# Patient Record
Sex: FEMALE | Race: WHITE | URBAN - METROPOLITAN AREA
[De-identification: names, ages, dates, MRNs, and addresses within clinical notes are randomized per-mention and may not be internally consistent; named-entity substitution may affect disease eponyms.]

---

## 2022-02-18 ENCOUNTER — OFFICE VISIT (OUTPATIENT)
Dept: URBAN - METROPOLITAN AREA CLINIC 23 | Facility: CLINIC | Age: 67
End: 2022-02-18

## 2022-03-23 ENCOUNTER — OFFICE VISIT (OUTPATIENT)
Dept: URBAN - METROPOLITAN AREA CLINIC 23 | Facility: CLINIC | Age: 67
End: 2022-03-23
Payer: MEDICARE

## 2022-03-23 ENCOUNTER — WEB ENCOUNTER (OUTPATIENT)
Dept: URBAN - METROPOLITAN AREA CLINIC 23 | Facility: CLINIC | Age: 67
End: 2022-03-23

## 2022-03-23 VITALS
HEIGHT: 68 IN | BODY MASS INDEX: 21.98 KG/M2 | SYSTOLIC BLOOD PRESSURE: 146 MMHG | DIASTOLIC BLOOD PRESSURE: 75 MMHG | TEMPERATURE: 97 F | WEIGHT: 145 LBS | HEART RATE: 57 BPM

## 2022-03-23 DIAGNOSIS — K21.9 GERD: ICD-10-CM

## 2022-03-23 DIAGNOSIS — Z12.11 SCREEN FOR COLON CANCER: ICD-10-CM

## 2022-03-23 DIAGNOSIS — R13.10 DYSPHAGIA: ICD-10-CM

## 2022-03-23 PROCEDURE — 99244 OFF/OP CNSLTJ NEW/EST MOD 40: CPT | Performed by: INTERNAL MEDICINE

## 2022-03-23 PROCEDURE — 99204 OFFICE O/P NEW MOD 45 MIN: CPT | Performed by: INTERNAL MEDICINE

## 2022-03-23 NOTE — HPI-TODAY'S VISIT:
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  she had radiation to that area -she states that it is always dry and not clear -there are times where she feels regurgitation of food sometimes feels that food gets tuck in the chest -she had esophageal dilation about 15 years ago-  -her symptoms have impoved in the last month  -she sees an ent for f/u- Dr. Marie- eval with laryngoscope was normal. has had radiation and chemo only for treatment, no surgery.  -she has some reflux intermittently- she states that it is intermittent, has been feeling better. takes tagamet intermittently.  hasn't made any major dietary changes -she states that her last colonoscopy  was over 10 years ago no findings on this  no family history of colon polyps or cancer -she has no hx of heart or lung disease, does see cardiologist.  no blood thinners no change in bm, bleeding, abdominal pain , weight loss

## 2022-05-17 ENCOUNTER — TELEPHONE ENCOUNTER (OUTPATIENT)
Dept: URBAN - METROPOLITAN AREA CLINIC 92 | Facility: CLINIC | Age: 67
End: 2022-05-17

## 2022-05-17 ENCOUNTER — CLAIMS CREATED FROM THE CLAIM WINDOW (OUTPATIENT)
Dept: URBAN - METROPOLITAN AREA CLINIC 4 | Facility: CLINIC | Age: 67
End: 2022-05-17
Payer: MEDICARE

## 2022-05-17 ENCOUNTER — OFFICE VISIT (OUTPATIENT)
Dept: URBAN - METROPOLITAN AREA SURGERY CENTER 15 | Facility: SURGERY CENTER | Age: 67
End: 2022-05-17

## 2022-05-17 ENCOUNTER — CLAIMS CREATED FROM THE CLAIM WINDOW (OUTPATIENT)
Dept: URBAN - METROPOLITAN AREA SURGERY CENTER 15 | Facility: SURGERY CENTER | Age: 67
End: 2022-05-17
Payer: MEDICARE

## 2022-05-17 DIAGNOSIS — Z12.11 COLON CANCER SCREENING: ICD-10-CM

## 2022-05-17 DIAGNOSIS — K63.89 CYST OF DUODENUM: ICD-10-CM

## 2022-05-17 DIAGNOSIS — K63.5 BENIGN COLON POLYP: ICD-10-CM

## 2022-05-17 DIAGNOSIS — D12.2 BENIGN NEOPLASM OF ASCENDING COLON: ICD-10-CM

## 2022-05-17 DIAGNOSIS — K20.8 OTHER ESOPHAGITIS: ICD-10-CM

## 2022-05-17 DIAGNOSIS — K22.2 ACQUIRED ESOPHAGEAL RING: ICD-10-CM

## 2022-05-17 DIAGNOSIS — K20.80 LYMPHOCYTIC ESOPHAGITIS: ICD-10-CM

## 2022-05-17 PROCEDURE — 45380 COLONOSCOPY AND BIOPSY: CPT | Performed by: INTERNAL MEDICINE

## 2022-05-17 PROCEDURE — 88305 TISSUE EXAM BY PATHOLOGIST: CPT | Performed by: PATHOLOGY

## 2022-05-17 PROCEDURE — 88312 SPECIAL STAINS GROUP 1: CPT | Performed by: PATHOLOGY

## 2022-05-17 PROCEDURE — 45381 COLONOSCOPY SUBMUCOUS NJX: CPT | Performed by: INTERNAL MEDICINE

## 2022-05-17 PROCEDURE — G8907 PT DOC NO EVENTS ON DISCHARG: HCPCS | Performed by: INTERNAL MEDICINE

## 2022-05-17 PROCEDURE — 45385 COLONOSCOPY W/LESION REMOVAL: CPT | Performed by: INTERNAL MEDICINE

## 2022-05-17 PROCEDURE — 43249 ESOPH EGD DILATION <30 MM: CPT | Performed by: INTERNAL MEDICINE

## 2022-05-17 PROCEDURE — 88342 IMHCHEM/IMCYTCHM 1ST ANTB: CPT | Performed by: PATHOLOGY

## 2022-05-17 PROCEDURE — 88341 IMHCHEM/IMCYTCHM EA ADD ANTB: CPT | Performed by: PATHOLOGY

## 2022-05-17 PROCEDURE — 43239 EGD BIOPSY SINGLE/MULTIPLE: CPT | Performed by: INTERNAL MEDICINE

## 2022-05-18 ENCOUNTER — TELEPHONE ENCOUNTER (OUTPATIENT)
Dept: URBAN - METROPOLITAN AREA CLINIC 49 | Facility: CLINIC | Age: 67
End: 2022-05-18

## 2022-05-27 ENCOUNTER — TELEPHONE ENCOUNTER (OUTPATIENT)
Dept: URBAN - METROPOLITAN AREA CLINIC 23 | Facility: CLINIC | Age: 67
End: 2022-05-27

## 2022-06-08 ENCOUNTER — OFFICE VISIT (OUTPATIENT)
Dept: URBAN - METROPOLITAN AREA CLINIC 12 | Facility: CLINIC | Age: 67
End: 2022-06-08

## 2022-06-09 ENCOUNTER — TELEPHONE ENCOUNTER (OUTPATIENT)
Dept: URBAN - METROPOLITAN AREA CLINIC 92 | Facility: CLINIC | Age: 67
End: 2022-06-09

## 2022-06-09 ENCOUNTER — OFFICE VISIT (OUTPATIENT)
Dept: URBAN - METROPOLITAN AREA CLINIC 23 | Facility: CLINIC | Age: 67
End: 2022-06-09
Payer: MEDICARE

## 2022-06-09 VITALS — HEIGHT: 68 IN | BODY MASS INDEX: 20.13 KG/M2 | WEIGHT: 132.8 LBS | TEMPERATURE: 97.5 F

## 2022-06-09 DIAGNOSIS — K20.80 LYMPHOCYTIC ESOPHAGITIS: ICD-10-CM

## 2022-06-09 DIAGNOSIS — Z86.010 PERSONAL HISTORY OF COLONIC POLYPS: ICD-10-CM

## 2022-06-09 DIAGNOSIS — K21.9 GERD: ICD-10-CM

## 2022-06-09 DIAGNOSIS — K22.2 ESOPHAGEAL STRICTURE: ICD-10-CM

## 2022-06-09 DIAGNOSIS — R13.10 DYSPHAGIA: ICD-10-CM

## 2022-06-09 PROCEDURE — G8427 DOCREV CUR MEDS BY ELIG CLIN: HCPCS | Performed by: INTERNAL MEDICINE

## 2022-06-09 PROCEDURE — G9622 NO UNHEAL ETOH USER: HCPCS | Performed by: INTERNAL MEDICINE

## 2022-06-09 PROCEDURE — G8420 CALC BMI NORM PARAMETERS: HCPCS | Performed by: INTERNAL MEDICINE

## 2022-06-09 PROCEDURE — 99214 OFFICE O/P EST MOD 30 MIN: CPT | Performed by: INTERNAL MEDICINE

## 2022-06-09 PROCEDURE — 1036F TOBACCO NON-USER: CPT | Performed by: INTERNAL MEDICINE

## 2022-06-09 PROCEDURE — 3017F COLORECTAL CA SCREEN DOC REV: CPT | Performed by: INTERNAL MEDICINE

## 2022-06-09 PROCEDURE — G9903 PT SCRN TBCO ID AS NON USER: HCPCS | Performed by: INTERNAL MEDICINE

## 2022-06-09 RX ORDER — OMEPRAZOLE 20 MG/1
1 CAPSULE 30 MINUTES BEFORE MORNING MEAL CAPSULE, DELAYED RELEASE ORAL ONCE A DAY
Qty: 90 | Refills: 1 | OUTPATIENT
Start: 2022-06-09

## 2022-06-09 NOTE — PREVIOUS WORKUP REVIEWED
.  ENDOSCOPIES colonoscopy 5/17- transverse , bx hp.  ascending and sigmoid polyp- TA and HP.   egd- lymphocytic esophagitis  LABS   IMAGES

## 2022-06-09 NOTE — HPI-TODAY'S VISIT:
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  she had radiation to that area -she states that it is always dry and not clear -there are times where she feels regurgitation of food sometimes feels that food gets tuck in the chest -she had esophageal dilation about 15 years ago-  -her symptoms have impoved in the last month  -she sees an ent for f/u- Dr. Marie- jeanieal with laryngoscope was normal. has had radiation and chemo only for treatment, no surgery.  -she has some reflux intermittently- she states that it is intermittent, has been feeling better. takes tagamet intermittently.  hasn't made any major dietary changes -she states that her last colonoscopy  was over 10 years ago no findings on this  no family history of colon polyps or cancer -she has no hx of heart or lung disease, does see cardiologist.  no blood thinners no change in bm, bleeding, abdominal pain , weight loss ====================================================================================== 6/9/2022 she states that she has had some bronchitis, had antibiotic treatment, saw ent has lost weight int he past month because of lack of appetite has not been feeling the food getting stuck as frfequently  sometimes feels vitamins getting stuck

## 2022-06-21 ENCOUNTER — OFFICE VISIT (OUTPATIENT)
Dept: URBAN - METROPOLITAN AREA MEDICAL CENTER 27 | Facility: MEDICAL CENTER | Age: 67
End: 2022-06-21
Payer: MEDICARE

## 2022-06-21 DIAGNOSIS — K22.2 ACQUIRED ESOPHAGEAL RING: ICD-10-CM

## 2022-06-21 DIAGNOSIS — K63.89 BACTERIAL OVERGROWTH SYNDROME: ICD-10-CM

## 2022-06-21 DIAGNOSIS — Z86.010 ADENOMAS PERSONAL HISTORY OF COLONIC POLYPS: ICD-10-CM

## 2022-06-21 PROCEDURE — 45385 COLONOSCOPY W/LESION REMOVAL: CPT | Performed by: INTERNAL MEDICINE

## 2022-06-21 PROCEDURE — 45381 COLONOSCOPY SUBMUCOUS NJX: CPT | Performed by: INTERNAL MEDICINE

## 2022-06-21 PROCEDURE — 43249 ESOPH EGD DILATION <30 MM: CPT | Performed by: INTERNAL MEDICINE

## 2022-06-21 RX ORDER — OMEPRAZOLE 20 MG/1
1 CAPSULE 30 MINUTES BEFORE MORNING MEAL CAPSULE, DELAYED RELEASE ORAL ONCE A DAY
Qty: 90 | Refills: 1 | Status: ACTIVE | COMMUNITY
Start: 2022-06-09

## 2022-06-22 ENCOUNTER — TELEPHONE ENCOUNTER (OUTPATIENT)
Dept: URBAN - METROPOLITAN AREA CLINIC 92 | Facility: CLINIC | Age: 67
End: 2022-06-22

## 2022-07-15 ENCOUNTER — TELEPHONE ENCOUNTER (OUTPATIENT)
Dept: URBAN - METROPOLITAN AREA CLINIC 6 | Facility: CLINIC | Age: 67
End: 2022-07-15

## 2022-08-04 ENCOUNTER — OFFICE VISIT (OUTPATIENT)
Dept: URBAN - METROPOLITAN AREA CLINIC 23 | Facility: CLINIC | Age: 67
End: 2022-08-04
Payer: MEDICARE

## 2022-08-04 ENCOUNTER — DASHBOARD ENCOUNTERS (OUTPATIENT)
Age: 67
End: 2022-08-04

## 2022-08-04 VITALS
TEMPERATURE: 97.7 F | HEIGHT: 68 IN | DIASTOLIC BLOOD PRESSURE: 63 MMHG | WEIGHT: 132.8 LBS | BODY MASS INDEX: 20.13 KG/M2 | HEART RATE: 55 BPM | SYSTOLIC BLOOD PRESSURE: 133 MMHG

## 2022-08-04 DIAGNOSIS — K21.9 GERD: ICD-10-CM

## 2022-08-04 DIAGNOSIS — K22.2 ESOPHAGEAL STRICTURE: ICD-10-CM

## 2022-08-04 DIAGNOSIS — R63.0 POOR APPETITE: ICD-10-CM

## 2022-08-04 DIAGNOSIS — K20.80 LYMPHOCYTIC ESOPHAGITIS: ICD-10-CM

## 2022-08-04 DIAGNOSIS — R13.10 DYSPHAGIA: ICD-10-CM

## 2022-08-04 DIAGNOSIS — Z86.010 PERSONAL HISTORY OF COLONIC POLYPS: ICD-10-CM

## 2022-08-04 PROCEDURE — 99214 OFFICE O/P EST MOD 30 MIN: CPT | Performed by: INTERNAL MEDICINE

## 2022-08-04 RX ORDER — OMEPRAZOLE 20 MG/1
1 CAPSULE 30 MINUTES BEFORE MORNING MEAL CAPSULE, DELAYED RELEASE ORAL ONCE A DAY
Qty: 90 | Refills: 1 | Status: ACTIVE | COMMUNITY
Start: 2022-06-09

## 2022-08-04 RX ORDER — OMEPRAZOLE 40 MG/1
1 CAPSULE 30 MINUTES BEFORE MORNING MEAL CAPSULE, DELAYED RELEASE ORAL ONCE A DAY
Qty: 90 | Refills: 1 | OUTPATIENT

## 2022-08-04 NOTE — HPI-TODAY'S VISIT:
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  she had radiation to that area -she states that it is always dry and not clear -there are times where she feels regurgitation of food sometimes feels that food gets tuck in the chest -she had esophageal dilation about 15 years ago-  -her symptoms have impoved in the last month  -she sees an ent for f/u- Dr. Marie- jeanieal with laryngoscope was normal. has had radiation and chemo only for treatment, no surgery.  -she has some reflux intermittently- she states that it is intermittent, has been feeling better. takes tagamet intermittently.  hasn't made any major dietary changes -she states that her last colonoscopy  was over 10 years ago no findings on this  no family history of colon polyps or cancer -she has no hx of heart or lung disease, does see cardiologist.  no blood thinners no change in bm, bleeding, abdominal pain , weight loss ====================================================================================== 6/9/2022 she states that she has had some bronchitis, had antibiotic treatment, saw ent has lost weight int he past month because of lack of appetite has not been feeling the food getting stuck as frfequently  sometimes feels vitamins getting stuck ========================================================================== 8/4/2022 states 'i feel good and better; she feels that the last dilation did help more

## 2022-08-04 NOTE — PREVIOUS WORKUP REVIEWED
.  ENDOSCOPIES colonoscopy 5/17- transverse , bx hp.  ascending and sigmoid polyp- TA and HP.   egd 5/17/2022- lymphocytic esophagitis, dilated to 5/2022 COLONOSCOPY- 6/2022- 2 cm polyp- benign mucosa with hyperplastic change EGD- 6/2022- dilated to 16.5 mm   LABS   IMAGES

## 2022-09-09 ENCOUNTER — TELEPHONE ENCOUNTER (OUTPATIENT)
Dept: URBAN - METROPOLITAN AREA CLINIC 6 | Facility: CLINIC | Age: 67
End: 2022-09-09

## 2022-11-07 ENCOUNTER — OFFICE VISIT (OUTPATIENT)
Dept: URBAN - METROPOLITAN AREA CLINIC 23 | Facility: CLINIC | Age: 67
End: 2022-11-07

## 2022-11-07 ENCOUNTER — TELEPHONE ENCOUNTER (OUTPATIENT)
Dept: URBAN - METROPOLITAN AREA CLINIC 23 | Facility: CLINIC | Age: 67
End: 2022-11-07

## 2022-11-07 RX ORDER — OMEPRAZOLE 40 MG/1
1 CAPSULE 30 MINUTES BEFORE MORNING MEAL CAPSULE, DELAYED RELEASE ORAL ONCE A DAY
Qty: 90 | Refills: 1

## 2023-01-09 ENCOUNTER — OFFICE VISIT (OUTPATIENT)
Dept: URBAN - METROPOLITAN AREA CLINIC 23 | Facility: CLINIC | Age: 68
End: 2023-01-09
Payer: MEDICARE

## 2023-01-09 VITALS
HEIGHT: 68 IN | DIASTOLIC BLOOD PRESSURE: 55 MMHG | HEART RATE: 85 BPM | SYSTOLIC BLOOD PRESSURE: 127 MMHG | BODY MASS INDEX: 19.7 KG/M2 | TEMPERATURE: 97.1 F | WEIGHT: 130 LBS

## 2023-01-09 DIAGNOSIS — Z86.010 PERSONAL HISTORY OF COLONIC POLYPS: ICD-10-CM

## 2023-01-09 DIAGNOSIS — K20.80 LYMPHOCYTIC ESOPHAGITIS: ICD-10-CM

## 2023-01-09 DIAGNOSIS — R13.10 DYSPHAGIA: ICD-10-CM

## 2023-01-09 DIAGNOSIS — K22.2 ESOPHAGEAL STRICTURE: ICD-10-CM

## 2023-01-09 DIAGNOSIS — R63.0 POOR APPETITE: ICD-10-CM

## 2023-01-09 DIAGNOSIS — K21.9 GERD: ICD-10-CM

## 2023-01-09 PROBLEM — 40739000 DYSPHAGIA: Status: ACTIVE | Noted: 2022-03-23

## 2023-01-09 PROBLEM — 64379006 POOR APPETITE: Status: ACTIVE | Noted: 2022-08-04

## 2023-01-09 PROBLEM — 428283002 HISTORY OF POLYP OF COLON (SITUATION): Status: ACTIVE | Noted: 2022-06-22

## 2023-01-09 PROBLEM — 63305008: Status: ACTIVE | Noted: 2022-06-09

## 2023-01-09 PROBLEM — 235595009 GASTROESOPHAGEAL REFLUX DISEASE: Status: ACTIVE | Noted: 2022-03-23

## 2023-01-09 PROCEDURE — 99213 OFFICE O/P EST LOW 20 MIN: CPT | Performed by: INTERNAL MEDICINE

## 2023-01-09 RX ORDER — OMEPRAZOLE 40 MG/1
1 CAPSULE 30 MINUTES BEFORE MORNING MEAL CAPSULE, DELAYED RELEASE ORAL ONCE A DAY
Qty: 90 | Refills: 1 | Status: ACTIVE | COMMUNITY

## 2023-01-09 RX ORDER — OMEPRAZOLE 40 MG/1
1 CAPSULE 30 MINUTES BEFORE MORNING MEAL CAPSULE, DELAYED RELEASE ORAL ONCE A DAY
Qty: 90 | Refills: 1 | OUTPATIENT

## 2023-01-09 NOTE — PREVIOUS WORKUP REVIEWED
.  ENDOSCOPIES colonoscopy 5/17- transverse , bx hp.  ascending and sigmoid polyp- TA and HP.   egd 5/17/2022- lymphocytic esophagitis, dilated to 15 mm COLONOSCOPY- 6/2022- 2 cm polyp- benign mucosa with hyperplastic change EGD- 6/2022- dilated to 16.5 mm   LABS   IMAGES

## 2023-01-09 NOTE — HPI-TODAY'S VISIT:
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  she had radiation to that area -she states that it is always dry and not clear -there are times where she feels regurgitation of food sometimes feels that food gets tuck in the chest -she had esophageal dilation about 15 years ago-  -her symptoms have impoved in the last month  -she sees an ent for f/u- Dr. Kasie foster with laryngoscope was normal. has had radiation and chemo only for treatment, no surgery.  -she has some reflux intermittently- she states that it is intermittent, has been feeling better. takes tagamet intermittently.  hasn't made any major dietary changes -she states that her last colonoscopy  was over 10 years ago no findings on this  no family history of colon polyps or cancer -she has no hx of heart or lung disease, does see cardiologist.  no blood thinners no change in bm, bleeding, abdominal pain , weight loss ====================================================================================== 6/9/2022 she states that she has had some bronchitis, had antibiotic treatment, saw ent has lost weight int he past month because of lack of appetite has not been feeling the food getting stuck as frfequently  sometimes feels vitamins getting stuck ========================================================================== 8/4/2022 states 'i feel good and better; she feels that the last dilation did help more ============================================================================= 1/9/2023 -has seen ent for burning of the tongue and found to have staph infection of the tongue and was treated, then had a repeat culture and was found to have another infection and started on a new antibitoic -she will f/u with Dr. Marie -states that her swallowing has been doing ok -she states that she  started having trouble with swallowing of her calcium pills, has been better though for about a week. states that food is going down better as well --however has had episodes of dysphagia to asparagus.  she has had episodes where things have gotten caught  -now states that she feels the issue is aboutt he same

## 2023-06-13 ENCOUNTER — TELEPHONE ENCOUNTER (OUTPATIENT)
Dept: URBAN - METROPOLITAN AREA CLINIC 63 | Facility: CLINIC | Age: 68
End: 2023-06-13